# Patient Record
Sex: MALE | Employment: OTHER | URBAN - METROPOLITAN AREA
[De-identification: names, ages, dates, MRNs, and addresses within clinical notes are randomized per-mention and may not be internally consistent; named-entity substitution may affect disease eponyms.]

---

## 2019-08-13 ENCOUNTER — CONSULTATION (OUTPATIENT)
Dept: URBAN - METROPOLITAN AREA CLINIC 11 | Facility: CLINIC | Age: 34
End: 2019-08-13

## 2019-08-13 VITALS — HEIGHT: 60 IN | DIASTOLIC BLOOD PRESSURE: 90 MMHG | SYSTOLIC BLOOD PRESSURE: 140 MMHG

## 2019-08-13 ASSESSMENT — TONOMETRY
OD_IOP_MMHG: 21
OS_IOP_MMHG: 21

## 2019-08-13 ASSESSMENT — VISUAL ACUITY
OS_CC: 20/20
OD_CC: 20/20

## 2019-08-13 NOTE — PATIENT DISCUSSION
Greater than 50% of exam spent in face to face dialogue with Mr. Maira Hernandez. I have explained to him that the nevus is very subtle on careful examination. I do not believe that this will develop into anything that will cause concern down the road. I have asked that he return on an as needed basis.

## 2022-06-09 ENCOUNTER — CONSULTATION/EVALUATION (OUTPATIENT)
Dept: URBAN - METROPOLITAN AREA CLINIC 4 | Facility: CLINIC | Age: 37
End: 2022-06-09

## 2022-06-09 DIAGNOSIS — H00.16: ICD-10-CM

## 2022-06-09 DIAGNOSIS — H01.02B: ICD-10-CM

## 2022-06-09 DIAGNOSIS — D31.21: ICD-10-CM

## 2022-06-09 DIAGNOSIS — H01.02A: ICD-10-CM

## 2022-06-09 PROCEDURE — 99204 OFFICE O/P NEW MOD 45 MIN: CPT

## 2022-06-09 PROCEDURE — 92250 FUNDUS PHOTOGRAPHY W/I&R: CPT

## 2022-06-09 ASSESSMENT — TONOMETRY
OS_IOP_MMHG: 13
OD_IOP_MMHG: 11

## 2022-06-09 ASSESSMENT — VISUAL ACUITY
OD_CC: 20/20
OS_CC: 20/20

## 2022-06-09 NOTE — PATIENT DISCUSSION
Discussed diagnosis in detail with patient. Discussed treatment options with patient. Patient instructed to apply warm compresses to the lids followed by lid massage. Aydin's baby shampoo lash rinse advised. New medication(s) Rx given today. Medication instillation reviewed and understood. Emphasized and explained compliance. Will continue to observe condition and or symptoms. Discussed risks of progression.

## 2022-06-09 NOTE — PATIENT DISCUSSION
Greater than 50% of exam spent in face to face dialogue with Frandy Rajiv Susan. I have explained to him that the nevus is very subtle on careful examination. I do not believe that this will develop into anything that will cause concern down the road. I have asked that he return on an as needed basis.

## 2022-06-16 NOTE — PATIENT DISCUSSION
Visually significant. Schedule cataract surgery * then * if visual symptoms persist. NEED TO GLARE PATIENT AT NEXT VISIT. PATIENT TO CALL FOR PREOP.

## 2022-07-06 RX ORDER — OMEPRAZOLE 40 MG/1
CAPSULE, DELAYED RELEASE ORAL
COMMUNITY

## 2022-07-06 RX ORDER — TRAMADOL HYDROCHLORIDE 50 MG/1
TABLET ORAL
COMMUNITY

## 2022-07-06 RX ORDER — MELOXICAM 7.5 MG/1
TABLET ORAL
COMMUNITY

## 2022-07-06 RX ORDER — METHOCARBAMOL 750 MG/1
TABLET, FILM COATED ORAL
COMMUNITY

## 2022-07-06 RX ORDER — GABAPENTIN 300 MG/1
CAPSULE ORAL
COMMUNITY

## 2022-07-06 RX ORDER — LISINOPRIL 20 MG/1
TABLET ORAL
COMMUNITY

## 2022-08-02 NOTE — PATIENT DISCUSSION
PT IS VERY DRY TODAY.  MEASUREMENTS ARE INACCURATE.  WILL REPEAT NEXT WEEK AND GIVE PT XIIDRA SAMPLES TO USE.

## 2022-08-02 NOTE — PATIENT DISCUSSION
DISCUSSED LENS OPTIONS WITH PT.  PT WANTS LEAST RISK OF GLARE AND HALOS.  WILL PROCEED WITH [facility] W/ LENSX.

## 2022-08-05 NOTE — PATIENT DISCUSSION
DISCUSSED LENS OPTIONS WITH PT.  PT WANTS LEAST RISK OF GLARE AND HALOS.  WILL PROCEED WITH [facility] W/ LENSX.

## 2022-09-19 PROBLEM — I10 ESSENTIAL HYPERTENSION: Status: ACTIVE | Noted: 2022-09-19

## 2022-09-19 PROBLEM — M51.24 PROTRUSION OF THORACIC INTERVERTEBRAL DISC: Status: ACTIVE | Noted: 2022-09-19

## 2022-09-19 PROBLEM — M50.20 DISPLACEMENT OF CERVICAL INTERVERTEBRAL DISC WITHOUT MYELOPATHY: Status: ACTIVE | Noted: 2022-09-19

## 2022-09-19 PROBLEM — M51.26 DISPLACEMENT OF LUMBAR INTERVERTEBRAL DISC WITHOUT MYELOPATHY: Status: ACTIVE | Noted: 2022-09-19

## 2022-09-19 PROBLEM — G89.29 OTHER CHRONIC PAIN: Status: ACTIVE | Noted: 2022-09-19

## 2022-09-19 PROBLEM — E78.1 HYPERTRIGLYCERIDEMIA: Status: ACTIVE | Noted: 2022-09-19

## 2022-09-19 PROBLEM — G89.4 CHRONIC PAIN DISORDER: Status: ACTIVE | Noted: 2022-09-19

## 2022-09-19 PROBLEM — E78.5 HYPERLIPIDEMIA: Status: ACTIVE | Noted: 2022-09-19
